# Patient Record
Sex: FEMALE | Race: BLACK OR AFRICAN AMERICAN | URBAN - METROPOLITAN AREA
[De-identification: names, ages, dates, MRNs, and addresses within clinical notes are randomized per-mention and may not be internally consistent; named-entity substitution may affect disease eponyms.]

---

## 2020-06-11 ENCOUNTER — OFFICE VISIT (OUTPATIENT)
Dept: URBAN - METROPOLITAN AREA CLINIC 84 | Facility: CLINIC | Age: 45
End: 2020-06-11
Payer: MEDICAID

## 2020-06-11 ENCOUNTER — DASHBOARD ENCOUNTERS (OUTPATIENT)
Age: 45
End: 2020-06-11

## 2020-06-11 DIAGNOSIS — K92.0 HEMATEMESIS: ICD-10-CM

## 2020-06-11 DIAGNOSIS — R11.14 BILIOUS VOMITING WITH NAUSEA: ICD-10-CM

## 2020-06-11 DIAGNOSIS — R10.10 UPPER ABDOMINAL PAIN: ICD-10-CM

## 2020-06-11 PROCEDURE — G8427 DOCREV CUR MEDS BY ELIG CLIN: HCPCS | Performed by: INTERNAL MEDICINE

## 2020-06-11 PROCEDURE — G9903 PT SCRN TBCO ID AS NON USER: HCPCS | Performed by: INTERNAL MEDICINE

## 2020-06-11 PROCEDURE — 99204 OFFICE O/P NEW MOD 45 MIN: CPT | Performed by: INTERNAL MEDICINE

## 2020-06-11 PROCEDURE — G8417 CALC BMI ABV UP PARAM F/U: HCPCS | Performed by: INTERNAL MEDICINE

## 2020-06-11 RX ORDER — NITROFURANTOIN 25 MG/5ML
10 ML WITH FOOD SUSPENSION ORAL ONCE A DAY
Status: ACTIVE | COMMUNITY

## 2020-06-11 RX ORDER — PHENAZOPYRIDINE HYDROCHLORIDE 200 MG/1
1 TABLET AFTER MEALS TABLET, FILM COATED ORAL THREE TIMES A DAY
Status: ACTIVE | COMMUNITY

## 2020-06-11 RX ORDER — LISINOPRIL 40 MG/1
1 TABLET TABLET ORAL ONCE A DAY
Status: ACTIVE | COMMUNITY

## 2020-06-11 RX ORDER — PANTOPRAZOLE SODIUM 40 MG/1
1 TABLET TABLET, DELAYED RELEASE ORAL ONCE A DAY
Status: ACTIVE | COMMUNITY

## 2020-06-11 NOTE — PHYSICAL EXAM GASTROINTESTINAL
Abdomen , soft, tender in epigastrium, nondistended , no guarding or rigidity , no masses palpable , normal bowel sounds , Liver and Spleen , no hepatomegaly present , liver nontender , spleen not palpable

## 2020-06-11 NOTE — HPI-TODAY'S VISIT:
went to [facility] with abdominal pain, last week. 1-2 days. generalized abdominal pain, hematemesis, nausea /vomting. has occured before. takes ibuprofen prn.

## 2020-06-17 ENCOUNTER — OFFICE VISIT (OUTPATIENT)
Dept: URBAN - METROPOLITAN AREA SURGERY CENTER 16 | Facility: SURGERY CENTER | Age: 45
End: 2020-06-17

## 2020-06-17 RX ORDER — LISINOPRIL 40 MG/1
1 TABLET TABLET ORAL ONCE A DAY
Status: ACTIVE | COMMUNITY

## 2020-06-17 RX ORDER — PHENAZOPYRIDINE HYDROCHLORIDE 200 MG/1
1 TABLET AFTER MEALS TABLET, FILM COATED ORAL THREE TIMES A DAY
Status: ACTIVE | COMMUNITY

## 2020-06-17 RX ORDER — PANTOPRAZOLE SODIUM 40 MG/1
1 TABLET TABLET, DELAYED RELEASE ORAL ONCE A DAY
Status: ACTIVE | COMMUNITY

## 2020-06-17 RX ORDER — NITROFURANTOIN 25 MG/5ML
10 ML WITH FOOD SUSPENSION ORAL ONCE A DAY
Status: ACTIVE | COMMUNITY

## 2020-07-29 ENCOUNTER — OFFICE VISIT (OUTPATIENT)
Dept: URBAN - METROPOLITAN AREA SURGERY CENTER 16 | Facility: SURGERY CENTER | Age: 45
End: 2020-07-29

## 2020-08-12 ENCOUNTER — OFFICE VISIT (OUTPATIENT)
Dept: URBAN - METROPOLITAN AREA SURGERY CENTER 16 | Facility: SURGERY CENTER | Age: 45
End: 2020-08-12
Payer: MEDICAID

## 2020-08-12 DIAGNOSIS — K29.60 ADENOPAPILLOMATOSIS GASTRICA: ICD-10-CM

## 2020-08-12 DIAGNOSIS — K31.7 BENIGN GASTRIC POLYP: ICD-10-CM

## 2020-08-12 PROCEDURE — 43239 EGD BIOPSY SINGLE/MULTIPLE: CPT | Performed by: INTERNAL MEDICINE

## 2020-08-12 PROCEDURE — G8907 PT DOC NO EVENTS ON DISCHARG: HCPCS | Performed by: INTERNAL MEDICINE

## 2020-08-12 RX ORDER — NITROFURANTOIN 25 MG/5ML
10 ML WITH FOOD SUSPENSION ORAL ONCE A DAY
Status: ACTIVE | COMMUNITY

## 2020-08-12 RX ORDER — LISINOPRIL 40 MG/1
1 TABLET TABLET ORAL ONCE A DAY
Status: ACTIVE | COMMUNITY

## 2020-08-12 RX ORDER — PHENAZOPYRIDINE HYDROCHLORIDE 200 MG/1
1 TABLET AFTER MEALS TABLET, FILM COATED ORAL THREE TIMES A DAY
Status: ACTIVE | COMMUNITY

## 2020-08-12 RX ORDER — PANTOPRAZOLE SODIUM 40 MG/1
1 TABLET TABLET, DELAYED RELEASE ORAL ONCE A DAY
Status: ACTIVE | COMMUNITY

## 2020-08-20 ENCOUNTER — LAB OUTSIDE AN ENCOUNTER (OUTPATIENT)
Dept: URBAN - METROPOLITAN AREA CLINIC 92 | Facility: CLINIC | Age: 45
End: 2020-08-20

## 2020-08-20 ENCOUNTER — WEB ENCOUNTER (OUTPATIENT)
Dept: URBAN - METROPOLITAN AREA CLINIC 92 | Facility: CLINIC | Age: 45
End: 2020-08-20

## 2020-08-27 ENCOUNTER — LAB OUTSIDE AN ENCOUNTER (OUTPATIENT)
Dept: URBAN - METROPOLITAN AREA CLINIC 25 | Facility: CLINIC | Age: 45
End: 2020-08-27